# Patient Record
Sex: FEMALE | NOT HISPANIC OR LATINO | Employment: PART TIME
[De-identification: names, ages, dates, MRNs, and addresses within clinical notes are randomized per-mention and may not be internally consistent; named-entity substitution may affect disease eponyms.]

---

## 2017-05-26 ENCOUNTER — HEALTH MAINTENANCE LETTER (OUTPATIENT)
Age: 57
End: 2017-05-26

## 2022-12-28 ENCOUNTER — LAB REQUISITION (OUTPATIENT)
Dept: LAB | Facility: CLINIC | Age: 62
End: 2022-12-28

## 2022-12-28 DIAGNOSIS — Z01.419 ENCOUNTER FOR GYNECOLOGICAL EXAMINATION (GENERAL) (ROUTINE) WITHOUT ABNORMAL FINDINGS: ICD-10-CM

## 2022-12-28 PROCEDURE — 87624 HPV HI-RISK TYP POOLED RSLT: CPT | Performed by: OBSTETRICS & GYNECOLOGY

## 2022-12-28 PROCEDURE — G0145 SCR C/V CYTO,THINLAYER,RESCR: HCPCS | Performed by: OBSTETRICS & GYNECOLOGY

## 2023-01-02 LAB
BKR LAB AP GYN ADEQUACY: NORMAL
BKR LAB AP GYN INTERPRETATION: NORMAL
BKR LAB AP HPV REFLEX: NORMAL
BKR LAB AP LMP: NORMAL
BKR LAB AP PREVIOUS ABNL DX: NORMAL
BKR LAB AP PREVIOUS ABNORMAL: NORMAL
PATH REPORT.COMMENTS IMP SPEC: NORMAL
PATH REPORT.COMMENTS IMP SPEC: NORMAL
PATH REPORT.RELEVANT HX SPEC: NORMAL

## 2023-01-03 LAB
HUMAN PAPILLOMA VIRUS 16 DNA: NEGATIVE
HUMAN PAPILLOMA VIRUS 18 DNA: NEGATIVE
HUMAN PAPILLOMA VIRUS FINAL DIAGNOSIS: NORMAL
HUMAN PAPILLOMA VIRUS OTHER HR: NEGATIVE

## 2023-02-17 ENCOUNTER — THERAPY VISIT (OUTPATIENT)
Dept: PHYSICAL THERAPY | Facility: CLINIC | Age: 63
End: 2023-02-17
Payer: COMMERCIAL

## 2023-02-17 DIAGNOSIS — M25.561 CHRONIC PAIN OF RIGHT KNEE: ICD-10-CM

## 2023-02-17 DIAGNOSIS — G89.29 CHRONIC PAIN OF RIGHT KNEE: ICD-10-CM

## 2023-02-17 PROCEDURE — 97161 PT EVAL LOW COMPLEX 20 MIN: CPT | Mod: GP | Performed by: PHYSICAL THERAPIST

## 2023-02-17 PROCEDURE — 97110 THERAPEUTIC EXERCISES: CPT | Mod: GP | Performed by: PHYSICAL THERAPIST

## 2023-02-17 PROCEDURE — 97140 MANUAL THERAPY 1/> REGIONS: CPT | Mod: GP | Performed by: PHYSICAL THERAPIST

## 2023-02-17 ASSESSMENT — ACTIVITIES OF DAILY LIVING (ADL)
GO UP STAIRS: ACTIVITY IS VERY DIFFICULT
PAIN: THE SYMPTOM AFFECTS MY ACTIVITY SEVERELY
SWELLING: THE SYMPTOM AFFECTS MY ACTIVITY SEVERELY
AS_A_RESULT_OF_YOUR_KNEE_INJURY,_HOW_WOULD_YOU_RATE_YOUR_CURRENT_LEVEL_OF_DAILY_ACTIVITY?: SEVERELY ABNORMAL
RAW_SCORE: 20
HOW_WOULD_YOU_RATE_THE_CURRENT_FUNCTION_OF_YOUR_KNEE_DURING_YOUR_USUAL_DAILY_ACTIVITIES_ON_A_SCALE_FROM_0_TO_100_WITH_100_BEING_YOUR_LEVEL_OF_KNEE_FUNCTION_PRIOR_TO_YOUR_INJURY_AND_0_BEING_THE_INABILITY_TO_PERFORM_ANY_OF_YOUR_USUAL_DAILY_ACTIVITIES?: 10
KNEE_ACTIVITY_OF_DAILY_LIVING_SCORE: 28.57
LIMPING: THE SYMPTOM AFFECTS MY ACTIVITY SEVERELY
WALK: ACTIVITY IS VERY DIFFICULT
STAND: ACTIVITY IS MINIMALLY DIFFICULT
HOW_WOULD_YOU_RATE_THE_OVERALL_FUNCTION_OF_YOUR_KNEE_DURING_YOUR_USUAL_DAILY_ACTIVITIES?: SEVERELY ABNORMAL
GIVING WAY, BUCKLING OR SHIFTING OF KNEE: THE SYMPTOM AFFECTS MY ACTIVITY SEVERELY
STIFFNESS: THE SYMPTOM AFFECTS MY ACTIVITY SEVERELY
WEAKNESS: THE SYMPTOM AFFECTS MY ACTIVITY SEVERELY
SIT WITH YOUR KNEE BENT: ACTIVITY IS MINIMALLY DIFFICULT
RISE FROM A CHAIR: ACTIVITY IS SOMEWHAT DIFFICULT
KNEE_ACTIVITY_OF_DAILY_LIVING_SUM: 20
GO DOWN STAIRS: ACTIVITY IS VERY DIFFICULT
KNEEL ON THE FRONT OF YOUR KNEE: I AM UNABLE TO DO THE ACTIVITY
SQUAT: I AM UNABLE TO DO THE ACTIVITY

## 2023-02-17 NOTE — LETTER
[facility]  [address] [place], [address] [facility] [address]  [facility] [address]  [phone]    2023  Re: Brenda York   :   1960  MRN:  [id number]   REFERRING PHYSICIAN:   ALEXANDER Ordone[facility]  Date of Initial Evaluation: 23  Visits:  Rxs Used: 1  Reason for Referral:  Chronic pain of right knee    EVALUATION SUMMARY    Physical Therapy Initial Evaluation  Subjective:  The history is provided by the patient. No  was used.   Patient Health History  Brenda York being seen for R Knee pain.   Date of Onset: 23.   Problem occurred: Possibly from shoveling many days in a row after snow storm   Pain is reported as 6/10 on pain scale.  General health as reported by patient is good.  Pertinent medical history includes: asthma, cancer, incontinence, menopausal, migraines/headaches, osteoarthritis and overweight.   Red flags:  None as reported by patient.  Medical allergies: latex.   Surgeries include:  Other.    Current medications:  None.    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   Primary job tasks include:  Computer work, lifting/carrying and repetitive tasks.                Therapist Generated HPI Evaluation  Problem details: Started having R knee pain in January after shoveling multiple days in a row. Did have fall on R knee around , but hasn't had much knee pain since..         Type of problem:  Right knee.  This is a new condition.  Condition occurred with:  Repetition/overuse.  Where condition occurred: at home.  Patient reports pain:  Anterior and medial.  Pain is described as aching and is intermittent.  Radiates to: none. Pain is the same all the time.  Since onset symptoms are gradually improving.  Associated symptoms:   Buckling/giving out, loss of motion/stiffness and loss of strength (denies numbness, tingling, or swelling). Symptoms are exacerbated by ascending stairs, descending stairs, walking and transfers (avoiding kneeling)  and relieved by bracing/immobilizing, ice, heat, rest and NSAID's.  Re: Brenda York   :   1960, page 2    Special tests included:  MRI.  Past treatment: cortisone injection 23; PT in [id number]. There was mild (mild improvement so far w/ recent injection; significant improvement w/ previous PT) improvement following previous treatment.  Restrictions due to condition include:  Working in normal job without restrictions.  Barriers include:  Stairs.                Objective:  Gait:    Gait Type:  Antalgic             Hip Evaluation  Hip Strength:    Flexion:   Left: 4+/5   Pain:  Right: 3+/5   +  Pain:    Knee Evaluation:  ROM:  Arom wnl knee: Prone knee flex 85.    AROM  Hyperextension:  Left:  4    Right: 4  Flexion: Left: 140    Right: 105 ++pain  PROM  Flexion: Left: 145 +pain   Right:   Strength:   Extension:  Left: 5-/5    Pain:+      Right: 4/5    Pain:++  Flexion:  Left: 5/5   Pain:      Right: 5-/5   Pain:    Quad Set Left: Good    Pain:   Quad Set Right: Good and delayed    Pain:  Palpation:  Palpation of knee: Tenderness w/ palpation of R quadriceps.    Assessment/Plan:    Patient is a 62 year old female with right side knee complaints.    Patient has the following significant findings with corresponding treatment plan.                Diagnosis 1:  R Knee pain  Pain -  hot/cold therapy, US, electric stimulation, manual therapy, splint/taping/bracing/orthotics, self management, education, directional preference exercise and home program  Decreased ROM/flexibility - manual therapy, therapeutic exercise, therapeutic activity and home program  Decreased strength - therapeutic exercise, therapeutic activities and home program  Impaired gait - gait training and home program  Impaired  muscle performance - neuro re-education and home program  Decreased function - therapeutic activities and home program    Therapy Evaluation Codes:   Cumulative Therapy Evaluation is: Low complexity.  Previous and current functional limitations:  (See Goal Flow Sheet for this information)    Short term and Long term goals: (See Goal Flow Sheet for this information)   Communication ability:  Patient appears to be able to clearly communicate and understand verbal and written communication and follow directions correctly.  Treatment Explanation - The following has been discussed with the patient:   RX ordered/plan of care  Anticipated outcomes.  Possible risks and side effects.  This patient would benefit from PT intervention to resume normal activities.   Rehab potential is good.    Re: Brenda York   :   1960. page 3      Frequency:  1 X week, once daily  Duration:  for 8 weeks  Discharge Plan:  Achieve all LTG.  Independent in home treatment program.  Reach maximal therapeutic benefit.        Thank you for your referral.    INQUIRIES  Therapist: Amanda Hilligoss, PT, DPT   [address] [address]  Phone: [phone]  Fax: [phone]

## 2023-02-17 NOTE — PROGRESS NOTES
Physical Therapy Initial Evaluation  Subjective:  The history is provided by the patient. No  was used.   Patient Health History  Brenda York being seen for R Knee pain.     Date of Onset: 1/2/23.   Problem occurred: Possibly from shoveling many days in a row after snow storm   Pain is reported as 6/10 on pain scale.  General health as reported by patient is good.  Pertinent medical history includes: asthma, cancer, incontinence, menopausal, migraines/headaches, osteoarthritis and overweight.   Red flags:  None as reported by patient.  Medical allergies: latex.   Surgeries include:  Other.    Current medications:  None.    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   Primary job tasks include:  Computer work, lifting/carrying and repetitive tasks.                  Therapist Generated HPI Evaluation  Problem details: Started having R knee pain in January after shoveling multiple days in a row. Did have fall on R knee around 2008, but hasn't had much knee pain since..         Type of problem:  Right knee.    This is a new condition.  Condition occurred with:  Repetition/overuse.  Where condition occurred: at home.  Patient reports pain:  Anterior and medial.  Pain is described as aching and is intermittent.  Radiates to: none. Pain is the same all the time.  Since onset symptoms are gradually improving.  Associated symptoms:  Buckling/giving out, loss of motion/stiffness and loss of strength (denies numbness, tingling, or swelling). Symptoms are exacerbated by ascending stairs, descending stairs, walking and transfers (avoiding kneeling)  and relieved by bracing/immobilizing, ice, heat, rest and NSAID's.  Special tests included:  MRI.  Past treatment: cortisone injection 2/9/23; PT in 4668-0525. There was mild (mild improvement so far w/ recent  injection; significant improvement w/ previous PT) improvement following previous treatment.  Restrictions due to condition include:  Working in normal job without restrictions.  Barriers include:  Stairs.                        Objective:    Gait:    Gait Type:  Antalgic                                                      Hip Evaluation    Hip Strength:    Flexion:   Left: 4+/5   Pain:  Right: 3+/5   +  Pain:                                                 Knee Evaluation:  ROM:  Arom wnl knee: Prone knee flex 85.    AROM    Hyperextension:  Left:  4    Right: 4    Flexion: Left: 140    Right: 105 ++pain  PROM        Flexion: Left: 145 +pain   Right:       Strength:     Extension:  Left: 5-/5    Pain:+      Right: 4/5    Pain:++  Flexion:  Left: 5/5   Pain:      Right: 5-/5   Pain:    Quad Set Left: Good    Pain:   Quad Set Right: Good and delayed    Pain:      Palpation:  Palpation of knee: Tenderness w/ palpation of R quadriceps.                General     ROS    Assessment/Plan:    Patient is a 62 year old female with right side knee complaints.    Patient has the following significant findings with corresponding treatment plan.                Diagnosis 1:  R Knee pain  Pain -  hot/cold therapy, US, electric stimulation, manual therapy, splint/taping/bracing/orthotics, self management, education, directional preference exercise and home program  Decreased ROM/flexibility - manual therapy, therapeutic exercise, therapeutic activity and home program  Decreased strength - therapeutic exercise, therapeutic activities and home program  Impaired gait - gait training and home program  Impaired muscle performance - neuro re-education and home program  Decreased function - therapeutic activities and home program    Therapy Evaluation Codes:   Cumulative Therapy Evaluation is: Low complexity.    Previous and current functional limitations:  (See Goal Flow Sheet for this information)    Short term and Long term goals:  (See Goal Flow Sheet for this information)     Communication ability:  Patient appears to be able to clearly communicate and understand verbal and written communication and follow directions correctly.  Treatment Explanation - The following has been discussed with the patient:   RX ordered/plan of care  Anticipated outcomes  Possible risks and side effects  This patient would benefit from PT intervention to resume normal activities.   Rehab potential is good.    Frequency:  1 X week, once daily  Duration:  for 8 weeks  Discharge Plan:  Achieve all LTG.  Independent in home treatment program.  Reach maximal therapeutic benefit.    Please refer to the daily flowsheet for treatment today, total treatment time and time spent performing 1:1 timed codes.

## 2023-02-24 ENCOUNTER — THERAPY VISIT (OUTPATIENT)
Dept: PHYSICAL THERAPY | Facility: CLINIC | Age: 63
End: 2023-02-24
Payer: COMMERCIAL

## 2023-02-24 DIAGNOSIS — G89.29 CHRONIC PAIN OF RIGHT KNEE: Primary | ICD-10-CM

## 2023-02-24 DIAGNOSIS — M25.561 CHRONIC PAIN OF RIGHT KNEE: Primary | ICD-10-CM

## 2023-02-24 PROCEDURE — 97035 APP MDLTY 1+ULTRASOUND EA 15: CPT | Mod: GP | Performed by: PHYSICAL THERAPIST

## 2023-02-24 PROCEDURE — 97110 THERAPEUTIC EXERCISES: CPT | Mod: GP | Performed by: PHYSICAL THERAPIST

## 2023-03-03 ENCOUNTER — THERAPY VISIT (OUTPATIENT)
Dept: PHYSICAL THERAPY | Facility: CLINIC | Age: 63
End: 2023-03-03
Payer: COMMERCIAL

## 2023-03-03 DIAGNOSIS — G89.29 CHRONIC PAIN OF RIGHT KNEE: Primary | ICD-10-CM

## 2023-03-03 DIAGNOSIS — M25.561 CHRONIC PAIN OF RIGHT KNEE: Primary | ICD-10-CM

## 2023-03-03 PROCEDURE — 97035 APP MDLTY 1+ULTRASOUND EA 15: CPT | Mod: GP | Performed by: PHYSICAL THERAPIST

## 2023-03-03 PROCEDURE — 97110 THERAPEUTIC EXERCISES: CPT | Mod: GP | Performed by: PHYSICAL THERAPIST

## 2023-03-03 PROCEDURE — 97140 MANUAL THERAPY 1/> REGIONS: CPT | Mod: GP | Performed by: PHYSICAL THERAPIST

## 2023-03-10 ENCOUNTER — THERAPY VISIT (OUTPATIENT)
Dept: PHYSICAL THERAPY | Facility: CLINIC | Age: 63
End: 2023-03-10
Payer: COMMERCIAL

## 2023-03-10 DIAGNOSIS — G89.29 CHRONIC PAIN OF RIGHT KNEE: Primary | ICD-10-CM

## 2023-03-10 DIAGNOSIS — M25.561 CHRONIC PAIN OF RIGHT KNEE: Primary | ICD-10-CM

## 2023-03-10 PROCEDURE — 97035 APP MDLTY 1+ULTRASOUND EA 15: CPT | Mod: GP | Performed by: PHYSICAL THERAPY ASSISTANT

## 2023-03-10 PROCEDURE — 97110 THERAPEUTIC EXERCISES: CPT | Mod: GP | Performed by: PHYSICAL THERAPY ASSISTANT

## 2023-03-10 PROCEDURE — 97140 MANUAL THERAPY 1/> REGIONS: CPT | Mod: GP | Performed by: PHYSICAL THERAPY ASSISTANT

## 2023-03-17 ENCOUNTER — THERAPY VISIT (OUTPATIENT)
Dept: PHYSICAL THERAPY | Facility: CLINIC | Age: 63
End: 2023-03-17
Payer: COMMERCIAL

## 2023-03-17 DIAGNOSIS — G89.29 CHRONIC PAIN OF RIGHT KNEE: Primary | ICD-10-CM

## 2023-03-17 DIAGNOSIS — M25.561 CHRONIC PAIN OF RIGHT KNEE: Primary | ICD-10-CM

## 2023-03-17 PROCEDURE — 97140 MANUAL THERAPY 1/> REGIONS: CPT | Mod: GP | Performed by: PHYSICAL THERAPIST

## 2023-03-17 PROCEDURE — 97110 THERAPEUTIC EXERCISES: CPT | Mod: GP | Performed by: PHYSICAL THERAPIST

## 2023-03-17 PROCEDURE — 97035 APP MDLTY 1+ULTRASOUND EA 15: CPT | Mod: GP | Performed by: PHYSICAL THERAPIST

## 2023-04-07 ENCOUNTER — THERAPY VISIT (OUTPATIENT)
Dept: PHYSICAL THERAPY | Facility: CLINIC | Age: 63
End: 2023-04-07
Payer: COMMERCIAL

## 2023-04-07 DIAGNOSIS — G89.29 CHRONIC PAIN OF RIGHT KNEE: Primary | ICD-10-CM

## 2023-04-07 DIAGNOSIS — M25.561 CHRONIC PAIN OF RIGHT KNEE: Primary | ICD-10-CM

## 2023-04-07 PROCEDURE — 97140 MANUAL THERAPY 1/> REGIONS: CPT | Mod: GP | Performed by: PHYSICAL THERAPIST

## 2023-04-07 PROCEDURE — 97035 APP MDLTY 1+ULTRASOUND EA 15: CPT | Mod: GP | Performed by: PHYSICAL THERAPIST

## 2023-04-14 ENCOUNTER — THERAPY VISIT (OUTPATIENT)
Dept: PHYSICAL THERAPY | Facility: CLINIC | Age: 63
End: 2023-04-14
Payer: COMMERCIAL

## 2023-04-14 DIAGNOSIS — G89.29 CHRONIC PAIN OF RIGHT KNEE: Primary | ICD-10-CM

## 2023-04-14 DIAGNOSIS — M25.561 CHRONIC PAIN OF RIGHT KNEE: Primary | ICD-10-CM

## 2023-04-14 PROCEDURE — 97110 THERAPEUTIC EXERCISES: CPT | Mod: GP | Performed by: PHYSICAL THERAPIST

## 2023-04-14 PROCEDURE — 97035 APP MDLTY 1+ULTRASOUND EA 15: CPT | Mod: GP | Performed by: PHYSICAL THERAPIST

## 2023-04-27 ENCOUNTER — THERAPY VISIT (OUTPATIENT)
Dept: PHYSICAL THERAPY | Facility: CLINIC | Age: 63
End: 2023-04-27
Payer: COMMERCIAL

## 2023-04-27 DIAGNOSIS — M25.561 CHRONIC PAIN OF RIGHT KNEE: Primary | ICD-10-CM

## 2023-04-27 DIAGNOSIS — G89.29 CHRONIC PAIN OF RIGHT KNEE: Primary | ICD-10-CM

## 2023-04-27 PROCEDURE — 97110 THERAPEUTIC EXERCISES: CPT | Mod: GP | Performed by: PHYSICAL THERAPIST

## 2023-04-27 PROCEDURE — 97035 APP MDLTY 1+ULTRASOUND EA 15: CPT | Mod: GP | Performed by: PHYSICAL THERAPIST

## 2023-04-27 ASSESSMENT — ACTIVITIES OF DAILY LIVING (ADL)
HOW_WOULD_YOU_RATE_THE_OVERALL_FUNCTION_OF_YOUR_KNEE_DURING_YOUR_USUAL_DAILY_ACTIVITIES?: ABNORMAL
PAIN: I HAVE THE SYMPTOM BUT IT DOES NOT AFFECT MY ACTIVITY
GO DOWN STAIRS: ACTIVITY IS MINIMALLY DIFFICULT
WEAKNESS: THE SYMPTOM AFFECTS MY ACTIVITY SLIGHTLY
AS_A_RESULT_OF_YOUR_KNEE_INJURY,_HOW_WOULD_YOU_RATE_YOUR_CURRENT_LEVEL_OF_DAILY_ACTIVITY?: ABNORMAL
STIFFNESS: THE SYMPTOM AFFECTS MY ACTIVITY MODERATELY
HOW_WOULD_YOU_RATE_THE_CURRENT_FUNCTION_OF_YOUR_KNEE_DURING_YOUR_USUAL_DAILY_ACTIVITIES_ON_A_SCALE_FROM_0_TO_100_WITH_100_BEING_YOUR_LEVEL_OF_KNEE_FUNCTION_PRIOR_TO_YOUR_INJURY_AND_0_BEING_THE_INABILITY_TO_PERFORM_ANY_OF_YOUR_USUAL_DAILY_ACTIVITIES?: 70
KNEE_ACTIVITY_OF_DAILY_LIVING_SCORE: 71.43
RISE FROM A CHAIR: ACTIVITY IS NOT DIFFICULT
SQUAT: ACTIVITY IS SOMEWHAT DIFFICULT
GIVING WAY, BUCKLING OR SHIFTING OF KNEE: THE SYMPTOM AFFECTS MY ACTIVITY SLIGHTLY
GO UP STAIRS: ACTIVITY IS MINIMALLY DIFFICULT
KNEE_ACTIVITY_OF_DAILY_LIVING_SUM: 50
LIMPING: I HAVE THE SYMPTOM BUT IT DOES NOT AFFECT MY ACTIVITY
KNEEL ON THE FRONT OF YOUR KNEE: I AM UNABLE TO DO THE ACTIVITY
SIT WITH YOUR KNEE BENT: ACTIVITY IS MINIMALLY DIFFICULT
WALK: ACTIVITY IS MINIMALLY DIFFICULT
SWELLING: I DO NOT HAVE THE SYMPTOM
STAND: ACTIVITY IS NOT DIFFICULT
RAW_SCORE: 50

## 2023-04-27 NOTE — PROGRESS NOTES
PROGRESS  REPORT    Progress reporting period is from 2/17/23 to 4/27/23.       SUBJECTIVE    Since care was initiated, the Pt has experienced decreased pain in the R knee and subjectively reports improvement in function as seen through the Knee Outcome Survey Activities of Daily Living Scale scores (28.57 to 71.43). Pt believes things have gotten better since care started, but continues to experience difficulties with sustained activities such as: walking, ascending/descending stairs, and caring for grandchildren.      Current Pain level: 0/10 at rest, continues to experience exacerbations of pain with sustained activity.  Initial Pain level: 6/10.     Changes in function:  Yes (See Goal flowsheet attached for changes in current functional level)    Adverse reactions: activity; Pt has experienced L knee pain and tenderness which she attributes to compensatory movement patterns d/t the R knee being painful.      OBJECTIVE    R Knee AROM: Flex 127*, Hyperextension 5*  L Knee AROM: Flex 137*, Hyperextension 4*    MMT: R knee ext 4-/5 pain with release; L knee ext 4/5       ASSESSMENT/PLAN    Updated problem list and treatment plan: Diagnosis 1:  R knee pain  Pain -  hot/cold therapy, US, electric stimulation, manual therapy, self management and education  Decreased ROM/flexibility - manual therapy, therapeutic exercise, therapeutic activity and home program  Decreased strength - therapeutic exercise, therapeutic activities and home program  Impaired muscle performance - neuro re-education and home program  Decreased function - therapeutic activities and home program  Instability -  Therapeutic Activity  Therapeutic Exercise  Neuromuscular Re-education  home program  STG/LTGs have been met or progress has been made towards goals:  Yes (See Goal flow sheet completed today.)  Assessment of Progress: The patient's condition is improving.  Self Management Plans:  Patient has been instructed in a home treatment  program.  Patient  has been instructed in self management of symptoms.  I have re-evaluated this patient and find that the nature, scope, duration and intensity of the therapy is appropriate for the medical condition of the patient.  Brenda continues to require the following intervention to meet STG and LTG's:  PT    Recommendations:    This patient would benefit from continued therapy.  Pt expresses a desire to continue working towards greater stability, flexibility, and function to help perform desired activities.    Frequency:  1 X a week, once daily  Duration:  for 6 visits        Please refer to the daily flowsheet for treatment today, total treatment time and time spent performing 1:1 timed codes.

## 2023-05-05 ENCOUNTER — THERAPY VISIT (OUTPATIENT)
Dept: PHYSICAL THERAPY | Facility: CLINIC | Age: 63
End: 2023-05-05
Payer: COMMERCIAL

## 2023-05-05 DIAGNOSIS — G89.29 CHRONIC PAIN OF RIGHT KNEE: Primary | ICD-10-CM

## 2023-05-05 DIAGNOSIS — M25.561 CHRONIC PAIN OF RIGHT KNEE: Primary | ICD-10-CM

## 2023-05-05 PROCEDURE — 97035 APP MDLTY 1+ULTRASOUND EA 15: CPT | Mod: GP | Performed by: PHYSICAL THERAPY ASSISTANT

## 2023-05-05 PROCEDURE — 97110 THERAPEUTIC EXERCISES: CPT | Mod: GP | Performed by: PHYSICAL THERAPY ASSISTANT

## 2023-05-25 ENCOUNTER — THERAPY VISIT (OUTPATIENT)
Dept: PHYSICAL THERAPY | Facility: CLINIC | Age: 63
End: 2023-05-25
Payer: COMMERCIAL

## 2023-05-25 DIAGNOSIS — M25.561 CHRONIC PAIN OF RIGHT KNEE: Primary | ICD-10-CM

## 2023-05-25 DIAGNOSIS — G89.29 CHRONIC PAIN OF RIGHT KNEE: Primary | ICD-10-CM

## 2023-05-25 PROCEDURE — 97140 MANUAL THERAPY 1/> REGIONS: CPT | Mod: GP | Performed by: PHYSICAL THERAPIST

## 2023-05-25 PROCEDURE — 97110 THERAPEUTIC EXERCISES: CPT | Mod: GP | Performed by: PHYSICAL THERAPIST

## 2023-06-30 ENCOUNTER — THERAPY VISIT (OUTPATIENT)
Dept: PHYSICAL THERAPY | Facility: CLINIC | Age: 63
End: 2023-06-30
Payer: COMMERCIAL

## 2023-06-30 DIAGNOSIS — M25.561 CHRONIC PAIN OF RIGHT KNEE: Primary | ICD-10-CM

## 2023-06-30 DIAGNOSIS — G89.29 CHRONIC PAIN OF RIGHT KNEE: Primary | ICD-10-CM

## 2023-06-30 PROCEDURE — 97110 THERAPEUTIC EXERCISES: CPT | Mod: GP | Performed by: PHYSICAL THERAPIST

## 2023-06-30 PROCEDURE — 97140 MANUAL THERAPY 1/> REGIONS: CPT | Mod: GP | Performed by: PHYSICAL THERAPIST

## 2023-06-30 NOTE — PROGRESS NOTES
"    PLAN  Continue therapy per current plan of care.  1x/week x 6 weeks, tapering to 2x/month x 1 month    Beginning/End Dates of Progress Note Reporting Period:  04/27/23 to 06/30/2023    Referring Provider:  Aaron Peck       06/30/23 0500   Appointment Info   Signing clinician's name / credentials Amanda Hilligoss, DPT   Total/Authorized Visits 19 (E&T; see PN 6-30-23)   Visits Used 11   Medical Diagnosis R knee pain   PT Tx Diagnosis R Knee pain   Progress Note/Certification   Onset of illness/injury or Date of Surgery 01/02/23   Therapy Frequency 1x/week   Predicted Duration x6 visits, tapering to 2x/month x 1 month   Progress Note Due Date 08/30/23   Progress Note Completed Date 04/27/23   GOALS   PT Goals 2   PT Goal 1   Goal Identifier Stairs   Goal Description Patient will be able to ascend stairs in normal reciprical pattern without pain   Rationale to maximize safety and independence with performance of ADLs and functional tasks;to maximize safety and independence within the home;to maximize safety and independence within the community   Goal Progress ascending staits in reciprical pattern w/ 4/10 pain, pt has not been to therapy in >1 month   Target Date 07/28/23   PT Goal 2   Goal Identifier stairs   Goal Description Patient will be able to descend stairs w/o compensation, retro step up 6\" B w/o functional valgus   Rationale to maximize safety and independence with performance of ADLs and functional tasks;to maximize safety and independence within the home;to maximize safety and independence within the community   Goal Progress descending stairs slowly in reciprical pattern w/ 2/10 pain   Target Date 07/20/23   Subjective Report   Subjective Report Has not been to therapy since 5/25/23 due to busy schedule watching [employer] and events for family. Had to drop down to 2 days/week for watching [employer]. Pt notes she continues to have increased pain in R anterior thigh, above her knee, worse w/ stairs " "ascending>descending. No pain w/ walking. Also noticing \"sciatica\" over past few weeks (some better this week).   Objective Measures   Objective Measures Objective Measure 1;Objective Measure 2;Objective Measure 3;Objective Measure 4   Objective Measure 1   Objective Measure Knee AROM   Details L Knee 5-0-140, R knee 5-0-120 ++pain anterior and medial knee   Objective Measure 2   Objective Measure Palpation   Details significant tenderness w/ palpation over medial and mild anterior knee   Objective Measure 3   Objective Measure MMT:   Details Hip Flex L 4+/5, R 3/5, Abd L 3+/5, R 3+/5, Hip Ext 5/5 B, Glute Max MMT 3/5 B, Knee flex L 4+/5, R 4/5, Knee ext  5/5 B   Objective Measure 4   Objective Measure SI Screen   Details (+) Active SLR R, (+) LUIZ R (unable to get into position, significant pain in knee); Anterior innominate R   Treatment Interventions (PT)   Interventions Therapeutic Procedure/Exercise;Manual Therapy   Therapeutic Procedure/Exercise   Therapeutic Procedures: strength, endurance, ROM, flexibillity minutes (77418) 15   Therapeutic Procedures Ther Proc 2;Ther Proc 3;Ther Proc 4;Ther Proc 5   Ther Proc 1 Upright bike   Ther Proc 1 - Details seat 2, resistance 7.0 x 5 min   Ther Proc 2 Prone   Ther Proc 2 - Details knee flex x10-15\" x 3 R   Ther Proc 3 Heelslides   Ther Proc 3 - Details x10 R   Ther Proc 4 SLR Abd R in L SL (B For HEP)   Ther Proc 4 - Details x20 AG   Skilled Intervention manual and verbal cuing for form (espeically w/ SLR abd)   Patient Response/Progress Knee flex to 135 by end of visit   Ther Proc 5 discussed returning to regular stretching and decrease in progress due to decreased frequency of PT and not being consistent at home, updated PTRx for exercises to focus on   Manual Therapy   Manual Therapy: Mobilization, MFR, MLD, friction massage minutes (15793) 25   Manual Therapy Manual Therapy 2;Manual Therapy 3   Manual Therapy 1 STM   Manual Therapy 1 - Details over anterior knee " w/ fascial rolling and tissue stacking x 10 min   Manual Therapy 2 STM   Manual Therapy 2 - Details Fascial pull over lateral knee x 5 min   Skilled Intervention unable to perform independently   Patient Response/Progress knee flex AROM increased to 130 after MET, 135 by end of visit   Manual Therapy 3 MET   Manual Therapy 3 - Details L Hip distraction w/ R hip hike, Derotation in supine, shotgun into add, R torsion correction in L SL   Education   Learner/Method Patient;Listening;Reading;Demonstration;Pictures/Video;No Barriers to Learning   Plan   Home program more consistent stretching in anterior thigh   Updates to plan of care continue per current Plan of Care- see PN for frequency/duration change   Plan for next session assess retro step ups/ form on stairs; continue [facility]   Total Session Time   Timed Code Treatment Minutes 40   Total Treatment Time (sum of timed and untimed services) 40

## 2023-07-28 ENCOUNTER — THERAPY VISIT (OUTPATIENT)
Dept: PHYSICAL THERAPY | Facility: CLINIC | Age: 63
End: 2023-07-28
Payer: COMMERCIAL

## 2023-07-28 DIAGNOSIS — M25.561 CHRONIC PAIN OF RIGHT KNEE: Primary | ICD-10-CM

## 2023-07-28 DIAGNOSIS — G89.29 CHRONIC PAIN OF RIGHT KNEE: Primary | ICD-10-CM

## 2023-07-28 PROCEDURE — 97140 MANUAL THERAPY 1/> REGIONS: CPT | Mod: 59 | Performed by: PHYSICAL THERAPIST

## 2023-07-28 PROCEDURE — 97530 THERAPEUTIC ACTIVITIES: CPT | Mod: GP | Performed by: PHYSICAL THERAPIST

## 2023-07-28 PROCEDURE — 97110 THERAPEUTIC EXERCISES: CPT | Mod: 59 | Performed by: PHYSICAL THERAPIST

## 2023-07-28 NOTE — TELEPHONE ENCOUNTER
DIAGNOSIS: right knee pain    APPOINTMENT DATE: 08/14/2023    NOTES STATUS DETAILS   OFFICE NOTE from referring provider N/A    OFFICE NOTE from other specialist Care Everywhere 06/30/2023 PT [facility]   02/09/2023 [facility]    DISCHARGE SUMMARY from hospital N/A    DISCHARGE REPORT from the ER N/A    OPERATIVE REPORT N/A    EMG report N/A    MEDICATION LIST N/A    MRI Received 01/16/2023 RT knee   DEXA (osteoporosis/bone health) N/A    CT SCAN N/A    XRAYS (IMAGES & REPORTS) N/A      Images in PACS

## 2023-08-14 ENCOUNTER — PRE VISIT (OUTPATIENT)
Dept: ORTHOPEDICS | Facility: CLINIC | Age: 63
End: 2023-08-14

## 2023-08-14 ENCOUNTER — ANCILLARY PROCEDURE (OUTPATIENT)
Dept: GENERAL RADIOLOGY | Facility: CLINIC | Age: 63
End: 2023-08-14
Attending: FAMILY MEDICINE
Payer: COMMERCIAL

## 2023-08-14 ENCOUNTER — OFFICE VISIT (OUTPATIENT)
Dept: ORTHOPEDICS | Facility: CLINIC | Age: 63
End: 2023-08-14
Payer: COMMERCIAL

## 2023-08-14 DIAGNOSIS — G89.29 CHRONIC PAIN OF LEFT KNEE: ICD-10-CM

## 2023-08-14 DIAGNOSIS — G89.29 CHRONIC PAIN OF RIGHT KNEE: Primary | ICD-10-CM

## 2023-08-14 DIAGNOSIS — M25.562 CHRONIC PAIN OF LEFT KNEE: ICD-10-CM

## 2023-08-14 DIAGNOSIS — M25.561 CHRONIC PAIN OF RIGHT KNEE: Primary | ICD-10-CM

## 2023-08-14 PROCEDURE — 99204 OFFICE O/P NEW MOD 45 MIN: CPT | Performed by: FAMILY MEDICINE

## 2023-08-14 PROCEDURE — 73564 X-RAY EXAM KNEE 4 OR MORE: CPT | Mod: LT | Performed by: RADIOLOGY

## 2023-08-14 RX ORDER — MELOXICAM 15 MG/1
15 TABLET ORAL DAILY PRN
Qty: 30 TABLET | Refills: 1 | Status: SHIPPED | OUTPATIENT
Start: 2023-08-14

## 2023-08-14 NOTE — LETTER
8/14/2023         RE: Brenda York  [address]  [facility] [address]        Dear Colleague,    Thank you for referring your patient, Brenda York, to the [facility] [place]. Please see a copy of my visit note below.    CHIEF COMPLAINT:  Consult For (Right knee pain; was seen at [facility]; seeing PT - she recommended a second opinion. Shoveling snow in December and next day was limping. Pain along medial aspect. )       HISTORY OF PRESENT ILLNESS  Ms. York is a pleasant 62 year old female who presents to clinic today with right knee pain.  Brenda has been experiencing right knee pain since December of this year.  She was shoveling snow and noticed a limp that developed over the next day.  Since that time her knee pain has gotten worse.  This does wax and wane, but is always present to some degree.  She points the medial aspect of her right knee.  She is seen at an outside orthopedic institution and had an x-ray and an MRI, this revealed some edema in her patella and lesions in her cartilage.  She did have knee injections that were performed by her primary care doctor, these did help her for some time as well.  She is quite frustrated by her pain.        Additional history: as documented    MEDICAL HISTORY  Patient Active Problem List   Diagnosis     Chronic pain of right knee       Current Outpatient Medications   Medication Sig Dispense Refill     METROGEL VAGINAL GEL 0.75 % VA 1 applicator pv qhs X 5 days qs 0       Allergies   Allergen Reactions     No Known Allergies        No family history on file.    Additional medical/Social/Surgical histories reviewed in [facility] and updated as appropriate.        PHYSICAL EXAM  General  - normal appearance, in no obvious distress  Musculoskeletal - right knee  - stance: mildly antalgic gait  - inspection: trace effusion  - palpation: medial joint line tenderness  - ROM: 120 degrees flexion, 0 degrees extension, painful active  ROM  - strength: 5/5 in flexion, 5/5 in extension  - special tests:  (-) Ratna  (-) varus at 0 and 30 degrees flexion  (-) valgus at 0 and 30 degrees flexion  Neuro  - no sensory or motor deficit, grossly normal coordination, normal muscle tone              ASSESSMENT & PLAN  Ms. York is a 62 year old female who presents to clinic today with right knee pain.    I ordered and independently reviewed an x-ray of her left knee, this does reveal left knee osteoarthritis.  I also reviewed her previous x-rays and MRIs in the room with her, revealing her right knee osteoarthritis.    Brenda and I had a good discussion centering around the spectrum of treatment options for osteoarthritis that preclude surgery.  We discussed nonoperative treatment with pain relievers, icing, low impact exercise, and weight loss.  We also talked about the role of injection therapy with corticosteroids and hyaluronic acid.    Ultimately we did decide to provide a short course of anti-inflammatories to use on an as helpful basis.  She is also going to pursue knee injections with her primary care physician.    I am ordering MR imaging of each knee as well.  I will get in touch with her with results.    It was a pleasure seeing Brenda today.    Deon Duenas DO, CAQSM  Primary Care Sports Medicine      This note was constructed using Dragon dictation software, please excuse any minor errors in spelling, grammar, or syntax.      Again, thank you for allowing me to participate in the care of your patient.        Sincerely,        Deon Duenas DO

## 2023-08-14 NOTE — PROGRESS NOTES
CHIEF COMPLAINT:  Consult For (Right knee pain; was seen at [facility]; seeing PT - she recommended a second opinion. Shoveling snow in December and next day was limping. Pain along medial aspect. )       HISTORY OF PRESENT ILLNESS  Ms. York is a pleasant 62 year old female who presents to clinic today with right knee pain.  Brenda has been experiencing right knee pain since December of this year.  She was shoveling snow and noticed a limp that developed over the next day.  Since that time her knee pain has gotten worse.  This does wax and wane, but is always present to some degree.  She points the medial aspect of her right knee.  She is seen at an outside orthopedic institution and had an x-ray and an MRI, this revealed some edema in her patella and lesions in her cartilage.  She did have knee injections that were performed by her primary care doctor, these did help her for some time as well.  She is quite frustrated by her pain.        Additional history: as documented    MEDICAL HISTORY  Patient Active Problem List   Diagnosis    Chronic pain of right knee       Current Outpatient Medications   Medication Sig Dispense Refill    METROGEL VAGINAL GEL 0.75 % VA 1 applicator pv qhs X 5 days qs 0       Allergies   Allergen Reactions    No Known Allergies        No family history on file.    Additional medical/Social/Surgical histories reviewed in EPIC and updated as appropriate.        PHYSICAL EXAM  General  - normal appearance, in no obvious distress  Musculoskeletal - right knee  - stance: mildly antalgic gait  - inspection: trace effusion  - palpation: medial joint line tenderness  - ROM: 120 degrees flexion, 0 degrees extension, painful active ROM  - strength: 5/5 in flexion, 5/5 in extension  - special tests:  (-) Ratna  (-) varus at 0 and 30 degrees flexion  (-) valgus at 0 and 30 degrees flexion  Neuro  - no sensory or motor deficit, grossly normal coordination, normal muscle tone              ASSESSMENT &  PLAN  Ms. York is a 62 year old female who presents to clinic today with right knee pain.    I ordered and independently reviewed an x-ray of her left knee, this does reveal left knee osteoarthritis.  I also reviewed her previous x-rays and MRIs in the room with her, revealing her right knee osteoarthritis.    Brenda and I had a good discussion centering around the spectrum of treatment options for osteoarthritis that preclude surgery.  We discussed nonoperative treatment with pain relievers, icing, low impact exercise, and weight loss.  We also talked about the role of injection therapy with corticosteroids and hyaluronic acid.    Ultimately we did decide to provide a short course of anti-inflammatories to use on an as helpful basis.  She is also going to pursue knee injections with her primary care physician.    I am ordering MR imaging of each knee as well.  I will get in touch with her with results.    It was a pleasure seeing Brenda today.    Deon Duenas DO, [facility]  Primary Care Sports Medicine      This note was constructed using Dragon dictation software, please excuse any minor errors in spelling, grammar, or syntax.

## 2023-08-14 NOTE — PATIENT INSTRUCTIONS
Thanks for coming today.  [facility]  [address] [place] [address]    To schedule future appointments in Ortho Clinic, you may call [phone].    To schedule ordered imaging or an injection ordered by your provider:  Call [facility] Injection scheduling line: [phone]    MyChart available online at:  [email or web address]    Please call if any further questions or concerns ([phone]).  Clinic hours 8 am to 5 pm.    Return to clinic (call) if symptoms worsen or fail to improve.

## 2023-09-12 ENCOUNTER — TELEPHONE (OUTPATIENT)
Dept: ORTHOPEDICS | Facility: CLINIC | Age: 63
End: 2023-09-12

## 2023-09-12 NOTE — TELEPHONE ENCOUNTER
[facility] Call Center    Phone Message    May a detailed message be left on voicemail: yes     Reason for Call: Other: Brenda Payne would like a call back to tell her about the mri results. Please call her. Thank you, Johanne     Action Taken: Other: MG    Travel Screening: Not Applicable

## 2023-10-06 ENCOUNTER — THERAPY VISIT (OUTPATIENT)
Dept: PHYSICAL THERAPY | Facility: CLINIC | Age: 63
End: 2023-10-06
Payer: COMMERCIAL

## 2023-10-06 DIAGNOSIS — G89.29 CHRONIC PAIN OF RIGHT KNEE: Primary | ICD-10-CM

## 2023-10-06 DIAGNOSIS — M25.561 CHRONIC PAIN OF RIGHT KNEE: Primary | ICD-10-CM

## 2023-10-06 PROCEDURE — 97530 THERAPEUTIC ACTIVITIES: CPT | Mod: GP

## 2023-10-06 PROCEDURE — 97110 THERAPEUTIC EXERCISES: CPT | Mod: GP

## 2023-10-06 ASSESSMENT — ACTIVITIES OF DAILY LIVING (ADL)
RISE FROM A CHAIR: ACTIVITY IS MINIMALLY DIFFICULT
GO UP STAIRS: ACTIVITY IS SOMEWHAT DIFFICULT
SIT WITH YOUR KNEE BENT: ACTIVITY IS MINIMALLY DIFFICULT
WEAKNESS: I HAVE THE SYMPTOM BUT IT DOES NOT AFFECT MY ACTIVITY
STAND: ACTIVITY IS NOT DIFFICULT
STIFFNESS: THE SYMPTOM AFFECTS MY ACTIVITY SLIGHTLY
KNEE_ACTIVITY_OF_DAILY_LIVING_SCORE: 71.43
PAIN: I HAVE THE SYMPTOM BUT IT DOES NOT AFFECT MY ACTIVITY
HOW_WOULD_YOU_RATE_THE_OVERALL_FUNCTION_OF_YOUR_KNEE_DURING_YOUR_USUAL_DAILY_ACTIVITIES?: NEARLY NORMAL
KNEE_ACTIVITY_OF_DAILY_LIVING_SUM: 50
WALK: ACTIVITY IS NOT DIFFICULT
SWELLING: THE SYMPTOM AFFECTS MY ACTIVITY SLIGHTLY
GIVING WAY, BUCKLING OR SHIFTING OF KNEE: THE SYMPTOM AFFECTS MY ACTIVITY MODERATELY
HOW_WOULD_YOU_RATE_THE_CURRENT_FUNCTION_OF_YOUR_KNEE_DURING_YOUR_USUAL_DAILY_ACTIVITIES_ON_A_SCALE_FROM_0_TO_100_WITH_100_BEING_YOUR_LEVEL_OF_KNEE_FUNCTION_PRIOR_TO_YOUR_INJURY_AND_0_BEING_THE_INABILITY_TO_PERFORM_ANY_OF_YOUR_USUAL_DAILY_ACTIVITIES?: 75
GO DOWN STAIRS: ACTIVITY IS MINIMALLY DIFFICULT
RAW_SCORE: 50
SQUAT: ACTIVITY IS SOMEWHAT DIFFICULT
LIMPING: I HAVE THE SYMPTOM BUT IT DOES NOT AFFECT MY ACTIVITY
AS_A_RESULT_OF_YOUR_KNEE_INJURY,_HOW_WOULD_YOU_RATE_YOUR_CURRENT_LEVEL_OF_DAILY_ACTIVITY?: NEARLY NORMAL
KNEEL ON THE FRONT OF YOUR KNEE: ACTIVITY IS FAIRLY DIFFICULT

## 2023-10-06 NOTE — PROGRESS NOTES
"   10/06/23 0500   Appointment Info   Signing clinician's name / credentials Amanda Hilligoss, DPT, Hunter Gonzalez SPT   Total/Authorized Visits 18 ([employer]; see PN 10-6-23)   Visits Used 13   Medical Diagnosis R knee pain   PT Tx Diagnosis R Knee pain   Quick Adds Student Supervision   Progress Note/Certification   Onset of illness/injury or Date of Surgery 01/02/23   Therapy Frequency 1x/week   Predicted Duration 2 weeks, tapering to 2x/month x 2 months   Progress Note Due Date 08/30/23   Progress Note Completed Date 06/30/23   GOALS   PT Goals 2   PT Goal 1   Goal Identifier Stairs   Goal Description Patient will be able to ascend stairs in normal reciprical pattern without pain   Rationale to maximize safety and independence with performance of ADLs and functional tasks;to maximize safety and independence within the home;to maximize safety and independence within the community   Goal Progress ascending staits in reciprical pattern w/ 0/10 at beginning of day, and 4/10 pain by end of day, pt has not been to therapy in >1 month   Target Date 11/03/23  (Goal extended out due to time between therapy sessions)   PT Goal 2   Goal Identifier stairs   Goal Description Patient will be able to descend stairs w/o compensation, retro step up 6\" B w/o functional valgus   Rationale to maximize safety and independence with performance of ADLs and functional tasks;to maximize safety and independence within the home;to maximize safety and independence within the community   Goal Progress retro step up to 6\" with compensation   Target Date 11/03/23  (Goal extended out due to time between therapy sessions)   Subjective Report   Subjective Report Has not been to therapy since 7/28/23 due to busy schedule. Pt had xrays done on both knees and reports she reached out to doctor regarding more information about the imaging but has not heard back. Discussed with pt more about what she read in the chart regarding the imaging. Primary complaint " "is still pain with stairs, has pain in anterior knee and medial knee depending on activity   Objective Measures   Objective Measures Objective Measure 1;Objective Measure 2;Objective Measure 3;Objective Measure 4;Objective Measure 5;Objective Measure 6   Objective Measure 1   Objective Measure Knee AROM   Details L knee:3-0-127 R knee: 0-0-121   Objective Measure 2   Objective Measure Palpation   Details no tenderness to palpation of patellar tendon or quad muscles bilaterally   Objective Measure 3   Objective Measure MMT:   Details L knee Flex: 5, ext: 5, R knee flex: 4, ext: 4. R hip flex: 3+, L hip flex: 4   Objective Measure 4   Objective Measure squat   Details pain in R knee with squat   Objective Measure 5   Objective Measure [place] L (-) R (-)   Details Prone knee flex R (5/10) pain, L no pain   Objective Measure 6   Objective Measure Step up   Details Retro step up 6\" B w/o pain with compensation on R, 7\" painful with R retrostep up   Treatment Interventions (PT)   Interventions Therapeutic Procedure/Exercise;Manual Therapy;Therapeutic Activity   Therapeutic Procedure/Exercise   Therapeutic Procedures: strength, endurance, ROM, flexibillity minutes (55658) 25   Therapeutic Procedures Ther Proc 2;Ther Proc 3;Ther Proc 4;Ther Proc 5   Ther Proc 1 Greek squats   Ther Proc 1 - Details 2x10 slow eccentric, significant reduction in pain with squat   Ther Proc 2 squat x4   Ther Proc 2 - Details favoring L side, painful   Ther Proc 3 prone knee flexion stretch with belt   Ther Proc 3 - Details x15 each leg   Ther Proc 4 education on importance of adherence to HEP due to length in time between PT sessions   Skilled Intervention decision making on exercises to assess pain location and response   Patient Response/Progress reduction in pain with French squats, tolerated more knee flexion with repeated motion   Therapeutic Activity   Therapeutic Activities: dynamic activities to improve functional performance " "minutes (39186) 15   Therapeutic Activities Ther Act 2;Ther Act 3   Ther Act 1 step up   Ther Act 1 - Details 8\" B x6 tolerated well   Ther Act 2 Retrostep up   Ther Act 2 - Details 4\", 6\", 7\", trialed 5 each, 7\" was painful with R knee   Ther Act 3 discussion about recent knee imaging and explaining medical terminology in the report   Skilled Intervention manual, verbal, and visual cuing   Patient Response/Progress pain with retrostep up at 7\", no pain and good control with anterior step up and 6\" retro step up   Education   Learner/Method Patient;Listening;Reading;Demonstration;Pictures/Video;No Barriers to Learning   Plan   Home program more consistent stretching in anterior thigh   Updates to plan of care continue per current Plan of Care- see PN for frequency/duration change   Plan for next session continue with eccentric strengthening of quads, MT as needed   Total Session Time   Timed Code Treatment Minutes 40   Total Treatment Time (sum of timed and untimed services) 40       PLAN  Continue therapy per current plan of care.    Beginning/End Dates of Progress Note Reporting Period:  06/30/23 to 10/06/2023    Referring Provider:  Aaron Peck   "

## 2023-10-13 ENCOUNTER — THERAPY VISIT (OUTPATIENT)
Dept: PHYSICAL THERAPY | Facility: CLINIC | Age: 63
End: 2023-10-13
Payer: COMMERCIAL

## 2023-10-13 DIAGNOSIS — G89.29 CHRONIC PAIN OF RIGHT KNEE: Primary | ICD-10-CM

## 2023-10-13 DIAGNOSIS — M25.561 CHRONIC PAIN OF RIGHT KNEE: Primary | ICD-10-CM

## 2023-10-13 PROCEDURE — 97140 MANUAL THERAPY 1/> REGIONS: CPT | Mod: GP | Performed by: PHYSICAL THERAPIST

## 2023-10-13 PROCEDURE — 97110 THERAPEUTIC EXERCISES: CPT | Mod: GP | Performed by: PHYSICAL THERAPIST

## 2023-11-03 ENCOUNTER — THERAPY VISIT (OUTPATIENT)
Dept: PHYSICAL THERAPY | Facility: CLINIC | Age: 63
End: 2023-11-03
Payer: COMMERCIAL

## 2023-11-03 DIAGNOSIS — M25.561 CHRONIC PAIN OF RIGHT KNEE: Primary | ICD-10-CM

## 2023-11-03 DIAGNOSIS — G89.29 CHRONIC PAIN OF RIGHT KNEE: Primary | ICD-10-CM

## 2023-11-03 PROCEDURE — 97112 NEUROMUSCULAR REEDUCATION: CPT | Mod: GP | Performed by: PHYSICAL THERAPIST

## 2023-11-03 PROCEDURE — 97110 THERAPEUTIC EXERCISES: CPT | Mod: GP | Performed by: PHYSICAL THERAPIST

## 2023-11-03 PROCEDURE — 97140 MANUAL THERAPY 1/> REGIONS: CPT | Mod: GP | Performed by: PHYSICAL THERAPIST

## 2023-12-29 ENCOUNTER — LAB REQUISITION (OUTPATIENT)
Dept: LAB | Facility: CLINIC | Age: 63
End: 2023-12-29
Payer: COMMERCIAL

## 2023-12-29 DIAGNOSIS — Z01.419 ENCOUNTER FOR GYNECOLOGICAL EXAMINATION (GENERAL) (ROUTINE) WITHOUT ABNORMAL FINDINGS: ICD-10-CM

## 2023-12-29 PROCEDURE — 87624 HPV HI-RISK TYP POOLED RSLT: CPT | Mod: ORL | Performed by: OBSTETRICS & GYNECOLOGY

## 2023-12-29 PROCEDURE — G0145 SCR C/V CYTO,THINLAYER,RESCR: HCPCS | Mod: ORL | Performed by: OBSTETRICS & GYNECOLOGY

## 2024-03-08 NOTE — PROGRESS NOTES
"    DISCHARGE  Reason for Discharge: Patient has failed to schedule further appointments.    Equipment Issued: none    Discharge Plan: Patient to continue home program.    Referring Provider:  Aaron Peck       11/03/23 0500   Appointment Info   Signing clinician's name / credentials Amanda Hilligoss, DPT   Total/Authorized Visits 18 ([employer]; see PN 10-6-23)   Visits Used 15   Medical Diagnosis R knee pain   PT Tx Diagnosis R Knee pain   Progress Note/Certification   Onset of illness/injury or Date of Surgery 01/02/23   Therapy Frequency 1x/week   Predicted Duration 2 weeks, tapering to 2x/month x 2 months   Progress Note Completed Date 10/06/23   GOALS   PT Goals 2   PT Goal 1   Goal Identifier Stairs   Goal Description Patient will be able to ascend stairs in normal reciprical pattern without pain   Rationale to maximize safety and independence with performance of ADLs and functional tasks;to maximize safety and independence within the home;to maximize safety and independence within the community   Goal Progress ascending stairs in reciprical pattern w/ 0/10, but feels slower   Target Date 11/03/23   Date Met 11/03/23   PT Goal 2   Goal Identifier stairs   Goal Description Patient will be able to descend stairs w/o compensation, retro step up 6\" B w/o functional valgus   Rationale to maximize safety and independence with performance of ADLs and functional tasks;to maximize safety and independence within the home;to maximize safety and independence within the community   Goal Progress retro step up to 6\" with compensation   Target Date 11/03/23  (Goal extended out due to time between therapy sessions)   Subjective Report   Subjective Report Pt notes overall her knee has been less painful. She has been able to ascend/descend stairs w/o pain increase, but notices she still does them slowly.   Objective Measures   Objective Measures Objective Measure 1;Objective Measure 2;Objective Measure 3;Objective Measure 4;Objective " "Measure 5;Objective Measure 6   Objective Measure 1   Objective Measure Knee AROM   Details L knee:3-0-140 R knee: 0-0-132   Objective Measure 2   Objective Measure Palpation   Details Hypomobile fascial noted over R quad   Objective Measure 3   Objective Measure MMT:   Details Hip Flex 5/5 B   Objective Measure 4   Objective Measure Tests   Details LUIZ (-), ASLR (-)   Treatment Interventions (PT)   Interventions Therapeutic Procedure/Exercise;Neuromuscular Re-education;Manual Therapy   Therapeutic Procedure/Exercise   Therapeutic Procedures: strength, endurance, ROM, flexibility minutes (10590) 10   Therapeutic Procedures Ther Proc 2;Ther Proc 3;Ther Proc 4;Ther Proc 5   Ther Proc 2 Updated PTRx and education to work on getting into routine of HEP/ strengthening for stabilizing knee   Ther Proc 3 prone knee flexion stretch w/ PT OP   Ther Proc 3 - Details continue for HEP   Ther Proc 4 Upright bike   Ther Proc 4 - Details seat 5, resistance 8.0 x 6 min   Ther Proc 5 Quad set   Skilled Intervention decision making on exercises to assess pain location and response   Patient Response/Progress reduction in pain with Welsh squats, tolerated more knee flexion with repeated motion   Ther Proc 5 - Details x5\" x 10   Neuromuscular Re-education   Neuromuscular re-ed of mvmt, balance, coord, kinesthetic sense, posture, proprioception minutes (26539) 10   Neuromuscular Re-education Neuro Re-ed 2;Neuro Re-ed 3;Neuro Re-ed 4   Neuro Re-ed 1 Concentric/ eccentric knee ext (supine, patient pushing into PT's shoulder)   Neuro Re-ed 1 - Details x10   Skilled Intervention manual and verbal cuing for correct for w/ exercises   Patient Response/Progress Improved glute activation w/ bridging w/ isometric abd   Neuro Re-ed 2 VMO SLR   Neuro Re-ed 2 - Details SLR w/ hip ER x 20 AG   Neuro Re-ed 3 VMO Bridge   Neuro Re-ed 3 - Details bridging w/ iso add, stopped due to difficulty activating glutes/ increased low back strain felt " "  Neuro Re-ed 4 Bridging w/ iso abd   Neuro Re-ed 4 - Details x5\" x 10   Manual Therapy   Manual Therapy: Mobilization, MFR, MLD, friction massage minutes (68702) 12   Manual Therapy 2 STM   Manual Therapy 2 - Details Fascial rolling over R quad x 12 min   Manual Therapy 3 Self massage   Manual Therapy 3 - Details reviewed using tennis ball for HEP   Skilled Intervention adjustment of technique, intensity, and location based on patient tolerance and tissue response   Patient Response/Progress no change in knee flex ROM after MT today   Education   Learner/Method Patient;Listening;Reading;Demonstration;Pictures/Video;No Barriers to Learning   Plan   Home program more consistent stretching in anterior thigh, quad extension against GTB w/ slow eccentrics   Updates to plan of care continue   Plan for next session continue with eccentric strengthening of quads, MT as needed   Total Session Time   Timed Code Treatment Minutes 32   Total Treatment Time (sum of timed and untimed services) 32     "

## 2024-12-30 ENCOUNTER — LAB REQUISITION (OUTPATIENT)
Dept: LAB | Facility: CLINIC | Age: 64
End: 2024-12-30
Payer: COMMERCIAL

## 2024-12-30 DIAGNOSIS — R82.998 OTHER ABNORMAL FINDINGS IN URINE: ICD-10-CM

## 2024-12-30 DIAGNOSIS — Z87.410 PERSONAL HISTORY OF CERVICAL DYSPLASIA: ICD-10-CM

## 2024-12-30 PROCEDURE — G0145 SCR C/V CYTO,THINLAYER,RESCR: HCPCS | Mod: ORL | Performed by: OBSTETRICS & GYNECOLOGY

## 2024-12-30 PROCEDURE — 87624 HPV HI-RISK TYP POOLED RSLT: CPT | Mod: ORL | Performed by: OBSTETRICS & GYNECOLOGY

## 2024-12-30 PROCEDURE — 87086 URINE CULTURE/COLONY COUNT: CPT | Mod: ORL | Performed by: OBSTETRICS & GYNECOLOGY

## 2024-12-31 LAB
HPV HR 12 DNA CVX QL NAA+PROBE: NEGATIVE
HPV16 DNA CVX QL NAA+PROBE: NEGATIVE
HPV18 DNA CVX QL NAA+PROBE: NEGATIVE
HUMAN PAPILLOMA VIRUS FINAL DIAGNOSIS: NORMAL

## 2025-01-01 LAB — BACTERIA UR CULT: NORMAL

## 2025-01-03 LAB
BKR AP ASSOCIATED HPV REPORT: NORMAL
BKR LAB AP GYN ADEQUACY: NORMAL
BKR LAB AP GYN INTERPRETATION: NORMAL
BKR LAB AP LMP: NORMAL
BKR LAB AP PREVIOUS ABNL DX: NORMAL
BKR LAB AP PREVIOUS ABNORMAL: NORMAL
PATH REPORT.COMMENTS IMP SPEC: NORMAL
PATH REPORT.COMMENTS IMP SPEC: NORMAL
PATH REPORT.RELEVANT HX SPEC: NORMAL

## 2025-09-01 ENCOUNTER — PATIENT OUTREACH (OUTPATIENT)
Dept: CARE COORDINATION | Facility: CLINIC | Age: 65
End: 2025-09-01
Payer: COMMERCIAL